# Patient Record
(demographics unavailable — no encounter records)

---

## 2025-04-15 NOTE — PHYSICAL EXAM
[Appropriately responsive] : appropriately responsive [Alert] : alert [No Acute Distress] : no acute distress [Soft] : soft [Non-tender] : non-tender [Non-distended] : non-distended [Oriented x3] : oriented x3 [Examination Of The Breasts] : a normal appearance [No Masses] : no breast masses were palpable [Labia Majora] : normal [Labia Minora] : normal [Normal] : normal [Retroversion] : retroverted [Uterine Adnexae] : normal

## 2025-04-15 NOTE — HISTORY OF PRESENT ILLNESS
[FreeTextEntry1] : Patient is a 27 year old, presenting for initial well woman exam.  Last annual: 2023 LMP: 3/31/25  GYNHx: denies hx of abnormal pap smears denies fibroids/endometriosis Hx of ovarian cysts Hx of Bartholin's cysts Hx of Chlamydia (tx 2019)  OBHx: nulliparous  Social Hx: lives with: mom and grandmother works/education:  diet/exercise: average, east when hungry but could eat better.  minimal exercise ETOH/smoking/drug use: social ETOH, vapes  Sexual Hx: most recent encounter was early March Contraception: OCPs (sidney) Concerns: none  PCP - does not have  Last physical exam: 2 years ago

## 2025-04-15 NOTE — PLAN
[FreeTextEntry1] : 1.Well person exam -Medical/surgical/family history reviewed -Allergies and medications reconciled -Pap: obtained today -STI testing - declines - HPV vaccine- unsure -Reviewed breast awareness/calcium/vitamin D/weight bearing exercise   2.Contraception - refills of OCPs provided   3.Health Care Maintenance -Cholesterol/vaccinations per PCP - COVID vaccination status reviewed.   4. Mental Health - pt screened for depression, no signs of clinical depression. PHQ-9 score reviewed over the course of the visit. - 5-10 minutes of face to face time. - follow up with changes in mood including other symptoms of anxiety all questions/concerns of pt addressed to their satisfaction